# Patient Record
Sex: MALE | Race: WHITE | NOT HISPANIC OR LATINO | Employment: FULL TIME | URBAN - METROPOLITAN AREA
[De-identification: names, ages, dates, MRNs, and addresses within clinical notes are randomized per-mention and may not be internally consistent; named-entity substitution may affect disease eponyms.]

---

## 2017-01-04 ENCOUNTER — TREATMENT (OUTPATIENT)
Dept: PHYSICAL THERAPY | Facility: CLINIC | Age: 52
End: 2017-01-04

## 2017-01-04 DIAGNOSIS — M76.62 ACHILLES TENDINITIS OF LEFT LOWER EXTREMITY: ICD-10-CM

## 2017-01-04 DIAGNOSIS — G89.29 CHRONIC PAIN OF LEFT ANKLE: Primary | ICD-10-CM

## 2017-01-04 DIAGNOSIS — M25.572 CHRONIC PAIN OF LEFT ANKLE: Primary | ICD-10-CM

## 2017-01-04 PROCEDURE — 97110 THERAPEUTIC EXERCISES: CPT | Performed by: PHYSICAL THERAPIST

## 2017-01-04 PROCEDURE — 97140 MANUAL THERAPY 1/> REGIONS: CPT | Performed by: PHYSICAL THERAPIST

## 2017-01-04 PROCEDURE — 97162 PT EVAL MOD COMPLEX 30 MIN: CPT | Performed by: PHYSICAL THERAPIST

## 2017-01-04 NOTE — PROGRESS NOTES
Complete Evaluation  NAME: Destiney Montes     PATIENT MRN: [id number]  DATE: 1/4/2017          PLAN OF CARE      THERAPY ASSESSMENT:  Physical Therapy Diagnosis: L achilles tendinosis  Functional Limitations: Complaints of Pain, Decreased ROM, Other (Decreased ability to participate in recreational activities)  Length of Therapy: 1 month  PT Frequency: PT 2x week  PT Interventions: Therapeutic exercise - AROM, Therapeutic exercise - stretching, Therapeutic exercise - strengthening, Manual Therapy, Soft Tissue mobilization, Hot packs/ Moist heat, Cold packs, Ice Massage, Ultrasound, Iontophoresis, Electrical stimulation, Home Exercise Program  Patient Agrees with Plan of Care: Yes      REHAB POTENTIAL: Prognosis: Excellent            SHORT TERM GOALS: Short Term Goals Time for Goal Achievement Options: 2 weeks, Patient demonstrates independence with exercise: Yes, Patient will increase ROM to WFL's: Yes, Other Goal: Pt is able to run 1 mile painfree, Other Goal - Progress: Pt is non tender to palpation         LONG TERM GOALS:  Long Term Goals Time for Goal Achievement Options: 4 weeks, Other Goal: Pt is able to run 3-5 miles painfree, Other Goal - Progress: Pt reports 0/10 pain at rest    Manual PT [id number] 15 minutes, Therapy Exercise 91642 10 minutes and Other Procedure CPT 8 minutes US    Timed Code Treatment: 33   Minutes     Total Treatment Time: 70      Minutes      PT SIGNATURE: Laura Art PT   DATE TREATMENT INITIATED: 1/4/2017    Initial Certification  Certification Period: 4/4/2017  I certify that the therapy services are furnished while this patient is under my care.  The services outlined above are required by this patient, and will be reviewed every 90 days.     PHYSICIAN: Dickson Sandoval MD      DATE:     Please sign and return via fax to [phone]. Thank you, [facility].            Background  Start Time: 1300  Mechanism of Injury: Started playing basketball at that  time, run 3-4x/week  Past Medical History: Same pain running marathon 5-6yrs ago  Occupation: IT, sedentary  Precautions/Work Restrictions: Full time  Recent Diagnosis Tests: OTHER (US)  Diagnostic Test Results: L achills tendinosis  Prior Level of Function: 2x/week, 3-4 miles; swimming  Pain Assessment  Pain Location Site: L achilles tendon  Pain Ratin (mild) (5/10 with running)  Symptoms  Describe Behavior Symptoms: ache  When Symptoms Occur: Worse in a.m.  Pain Increases with:: Running, basketball  Behavior of Symptoms  Pain Interrupts Sleep: No  Sleep Position: Supine  Patient Goals  Goals: Eliminate pain, run 1 marathon/yr, 7:30 min/mile half 9:00 min/mile full  Pt Participated in POC and Goals: Yes      General  Palpation: TTP L superior achilles tendon; lat gastroc  Observation: Normal gait; new shoes show some signs of supination B         AROM RLE (degrees)  R Ankle Dorsiflexion 0-20: 8  R Ankle Plantar Flexion 0-45: 38  R Ankle Inversion: 28  R Ankle Eversion: 32     Strength RLE  R Ankle Dorsiflexion: 5/5  R Ankle Plantar Flexion: 5/5  R Ankle Strength Inversion: 5  R Ankle Strength Eversion: 5        AROM LLE (degrees)  L Ankle Dorsiflexion 0-20: 0  L Ankle Plantar Flexion 0-45: 38  R Ankle Inversion: 24  R Ankle Eversion: 30     Strength LLE  L Ankle Dorsiflexion: 5/5  L Ankle Plantar Flexion: 5/5  L Ankle Strength Inversion: 5/5  L Ankle Strength Eversion: 5/5         Lower Extremity Flexibility  Hamstrings: Bilateral:, Moderately limited  Quadriceps: Bilateral:, Mildly limited         Assessment:  Physical Therapy Diagnosis: L achilles tendinosis  Functional Limitations: Complaints of Pain, Decreased ROM, Other (Decreased ability to participate in recreational activities)  Length of Therapy: 1 month  PT Frequency: PT 2x week  PT Interventions: Therapeutic exercise - AROM, Therapeutic exercise - stretching, Therapeutic exercise - strengthening, Manual Therapy, Soft Tissue mobilization, Hot packs/  Moist heat, Cold packs, Ice Massage, Ultrasound, Iontophoresis, Electrical stimulation, Home Exercise Program  Patient Agrees with Plan of Care: Yes  Evaluation Times  Start Time: 1300  Stop Time: 1330  Total Evaluation Time (minutes): 30 min(s)

## 2017-01-04 NOTE — PATIENT INSTRUCTIONS
Pt given HEP and educated re exercises.  Pt educated re diagnosis, relevant anatomy, prognosis, and POC.

## 2017-01-10 ENCOUNTER — TREATMENT (OUTPATIENT)
Dept: PHYSICAL THERAPY | Facility: CLINIC | Age: 52
End: 2017-01-10

## 2017-01-10 DIAGNOSIS — G89.29 CHRONIC PAIN OF LEFT ANKLE: Primary | ICD-10-CM

## 2017-01-10 DIAGNOSIS — M76.62 ACHILLES TENDINITIS OF LEFT LOWER EXTREMITY: ICD-10-CM

## 2017-01-10 DIAGNOSIS — M25.572 CHRONIC PAIN OF LEFT ANKLE: Primary | ICD-10-CM

## 2017-01-10 PROCEDURE — 97140 MANUAL THERAPY 1/> REGIONS: CPT | Performed by: PHYSICAL THERAPIST

## 2017-01-10 PROCEDURE — 97110 THERAPEUTIC EXERCISES: CPT | Performed by: PHYSICAL THERAPIST

## 2017-01-10 NOTE — PROGRESS NOTES
Daily Progress Note    Time In: 1200      Time Out: 1250    Subjective   Pt reports he ran Saturday and today. Saturday felt better. Ankle aches today, not considered painful.      Objective   General  Observation: Old shoes show no significant abnormalities in wear pattern; mild pronation B        PROCEDURES AND MODALITIES:                      Ultrasound 03890  Location: L achilles tendon  Rx Minutes: 6/8  Duty Cycle: 100  Frequency: 1.0 MHz  Intensity - Wts/cm: 1.5              EXERCISE  Exercise 1  Exercise Name 1: Standing lunge calf stretch  Sets/Reps 1: 4  Time: 20s  Exercise 2  Exercise Name 2: Eccentric SL calf raise on step  Sets/Reps 2: 15 Exercise 3  Exercise Name 3: Ankle pumps  Sets/Reps 3: 15 Exercise 4  Exercise Name 4: AROM inversion/eversion  Sets/Reps 4: 15 Exercise 5  Exercise Name 5: Short foot  Sets/Reps 5: 15 Exercise 6  Exercise Name 6: TB eccentric DF  Equipment/Resistance 6: blue  Sets/Reps 6: 15   Exercise 7  Exercise Name 7: Towel inversion/eversion  Sets/Reps 7: 15 each Exercise 8  Exercise Name 8: Bike   Equipment/Resistance 8: AROM  Time 8: 5 mins                                       MANUAL PT:  Manual Rx 1 Location: L gastroc soleus  Manual Rx 1 Type: STM  Manual Rx 2 Location: L achilles tendon  Manual Rx 2 Type: IASTM                     Manual PT [id number] 10 minutes, Therapy Exercise 88666 27 minutes and Other Procedure CPT 8 minutes us    Timed Code Treatment: 45 Minutes and Total Treatment Time: 50 Minutes    Assessment/Plan   Pt tolerated session well w/o c/o pain. Continues to have increased tone in lateral gastroc and mid achilles. Added eccentrics to HEP.  Progress per Plan of Care             Laura Art, PT  Physical Therapist

## 2017-01-13 ENCOUNTER — TREATMENT (OUTPATIENT)
Dept: PHYSICAL THERAPY | Facility: CLINIC | Age: 52
End: 2017-01-13

## 2017-01-13 DIAGNOSIS — M76.62 ACHILLES TENDINITIS OF LEFT LOWER EXTREMITY: Primary | ICD-10-CM

## 2017-01-13 PROCEDURE — 97110 THERAPEUTIC EXERCISES: CPT | Performed by: PHYSICAL THERAPIST

## 2017-01-13 PROCEDURE — 97140 MANUAL THERAPY 1/> REGIONS: CPT | Performed by: PHYSICAL THERAPIST

## 2017-01-13 NOTE — PROGRESS NOTES
Daily Progress Note    Time In: 1:01      Time Out: 1:53    Subjective   About the same - still aches with running        Objective   General  Palpation: TTP M-T junction    AROM:             , , , ,         PROCEDURES AND MODALITIES:                      Ultrasound 39073  Location: L achilles tendon  Rx Minutes: 6/8  Duty Cycle: 100  Frequency: 1.0 MHz  Intensity - Wts/cm: 1.6              EXERCISE  Exercise 1  Exercise Name 1: Standing lunge calf stretch; soleaus stretch  Sets/Reps 1: 4  Time: 20s  Exercise 1 Completed: Yes  Exercise 2  Exercise Name 2: Eccentric SL calf raise on step  Sets/Reps 2: 20  Exercise 2 Completed: Yes Exercise 3  Exercise Name 3: Ankle alphabet  Sets/Reps 3: 1  Exercise 3 Completed: Yes Exercise 4  Exercise Name 4: AROM inversion/eversion - reverse CKC  Sets/Reps 4: 20  Exercise 4 Completed: Yes Exercise 5  Exercise Name 5: Short foot  Sets/Reps 5: 15  Exercise 5 Completed: Yes Exercise 6  Exercise Name 6: TB eccentric DF  Equipment/Resistance 6: blue  Sets/Reps 6: 15  Exercise 6 Completed: Yes   Exercise 7  Exercise Name 7: Towel inversion/eversion  Sets/Reps 7: 15 each  Exercise 7 Completed: Yes Exercise 8  Exercise Name 8: Bike   Equipment/Resistance 8: AROM  Time 8: 5 mins  Exercise 8 Completed: Defer Exercise 9  Exercise Name 9: crosspull with DF/PF  Equipment/Resistance 9: blue  Sets/Reps 9: 20 ea  Exercise 9 Completed: Yes Exercise 10  Exercise Name 10: 3-way balance on 1/2 roll  Time 10: 1 min ea  Exercise 10 Completed: Yes                                   MANUAL PT:  Manual Rx 1 Location: L achilles  Manual Rx 1 Type: STM/DFM  Manual Rx 2 Location: L calf   Manual Rx 2 Type: C/R stretches                     Manual PT [id number] 10 minutes, Therapy Exercise 03733 30 minutes and Other Procedure CPT 81391 minutes 8    Timed Code Treatment: 48 Minutes and Total Treatment Time: 52 Minutes    Assessment/Plan   Improved mobility with only mild c/o pain but still with tenderness and  limited tolerance for prolonged activities    Progress per Plan of Care             More Alex, PT  Physical Therapist

## 2017-01-17 ENCOUNTER — TREATMENT (OUTPATIENT)
Dept: PHYSICAL THERAPY | Facility: CLINIC | Age: 52
End: 2017-01-17

## 2017-01-17 DIAGNOSIS — M25.572 CHRONIC PAIN OF LEFT ANKLE: ICD-10-CM

## 2017-01-17 DIAGNOSIS — M76.62 ACHILLES TENDINITIS OF LEFT LOWER EXTREMITY: Primary | ICD-10-CM

## 2017-01-17 DIAGNOSIS — G89.29 CHRONIC PAIN OF LEFT ANKLE: ICD-10-CM

## 2017-01-17 PROCEDURE — 97140 MANUAL THERAPY 1/> REGIONS: CPT | Performed by: PHYSICAL THERAPIST

## 2017-01-17 PROCEDURE — 97110 THERAPEUTIC EXERCISES: CPT | Performed by: PHYSICAL THERAPIST

## 2017-01-17 NOTE — PROGRESS NOTES
Daily Progress Note    Time In: 1200      Time Out: 1238    Subjective   Pt reports he is feeling improvement. Ran 3 miles under 8 min pace this morning. Still feels ache but does not feel the need to stop.      Objective        EXERCISE  Exercise 1  Exercise Name 1: Standing lunge calf stretch; soleus stretch  Sets/Reps 1: 4  Time: 20s  Exercise 1 Completed: Yes  Exercise 2  Exercise Name 2: Eccentric SL calf raise on step  Sets/Reps 2: 20  Exercise 2 Completed: Yes Exercise 3  Exercise Name 3: Ankle alphabet  Sets/Reps 3: 1  Exercise 3 Completed: Yes Exercise 4  Exercise Name 4: AROM inversion/eversion - reverse CKC  Sets/Reps 4: 20  Exercise 4 Completed: Yes Exercise 5  Exercise Name 5: Short foot  Sets/Reps 5: 15  Exercise 5 Completed: Yes Exercise 6  Exercise Name 6: TB eccentric DF  Equipment/Resistance 6: blue  Sets/Reps 6: 15  Exercise 6 Completed: Yes   Exercise 7  Exercise Name 7: Towel inversion/eversion  Sets/Reps 7: 15 each  Exercise 7 Completed: Defer Exercise 8  Exercise Name 8: Bike   Equipment/Resistance 8: AROM  Time 8: 5 mins  Exercise 8 Completed: Defer Exercise 9  Exercise Name 9: crosspull with DF/PF  Equipment/Resistance 9: blue  Sets/Reps 9: 20 ea  Exercise 9 Completed: Yes Exercise 10  Exercise Name 10: 3-way balance on 1/2 roll  Time 10: 1 min ea  Exercise 10 Completed: Yes Exercise 11  Exercise Name 11: Squat  Sets/Reps 11: 15  Exercise 11 Completed: Yes                                 MANUAL PT:  Manual Rx 1 Location: L achilles, gastrocsoleus  Manual Rx 1 Type: STM, scraping                        Manual PT [id number] 8 minutes and Therapy Exercise 99709 30 minutes    Timed Code Treatment: 38 Minutes and Total Treatment Time: 38 Minutes    Assessment/Plan   Pt had increased difficulty with balance exercise. May benefit from further exercises for ankle stability.   Progress strengthening /stabilization /functional activity squat with resisted hip abd             Laura Art, PT  Physical  Therapist

## 2017-01-20 ENCOUNTER — TREATMENT (OUTPATIENT)
Dept: PHYSICAL THERAPY | Facility: CLINIC | Age: 52
End: 2017-01-20

## 2017-01-20 DIAGNOSIS — M76.62 ACHILLES TENDINITIS OF LEFT LOWER EXTREMITY: Primary | ICD-10-CM

## 2017-01-20 PROCEDURE — 97110 THERAPEUTIC EXERCISES: CPT | Performed by: PHYSICAL THERAPIST

## 2017-01-20 PROCEDURE — 97140 MANUAL THERAPY 1/> REGIONS: CPT | Performed by: PHYSICAL THERAPIST

## 2017-01-20 NOTE — PROGRESS NOTES
Daily Progress Note    Time In: 1:04      Time Out: 1:49    Subjective   It's getting better.  Had some discomfort playing basketball but did stretches and it helped.        Objective   General  Palpation: TTP distal achilles    AROM:                        , , , ,      , , , ,         PROCEDURES AND MODALITIES:                      Ultrasound 52770  Location: L achilles tendon  Rx Minutes: 6/8  Duty Cycle: 100  Frequency: 1.0 MHz  Intensity - Wts/cm: 1.6              EXERCISE  Exercise 1  Exercise Name 1: Standing lunge calf stretch; soleus stretch  Sets/Reps 1: 4  Time: 20s  Exercise 1 Completed: Yes  Exercise 2  Exercise Name 2: Eccentric SL calf raise on step  Sets/Reps 2: 20  Exercise 2 Completed: Yes Exercise 3  Exercise Name 3: Ankle alphabet  Sets/Reps 3: 1  Exercise 3 Completed: Yes Exercise 4  Exercise Name 4: AROM inversion/eversion - reverse CKC  Equipment/Resistance 4: red  Sets/Reps 4: 20  Exercise 4 Completed: Yes Exercise 5  Exercise Name 5: Short foot  Sets/Reps 5: 15  Exercise 5 Completed: Yes Exercise 6  Exercise Name 6: TB eccentric DF  Equipment/Resistance 6: blue  Sets/Reps 6: 15  Exercise 6 Completed: Defer   Exercise 7  Exercise Name 7: Towel inversion/eversion  Sets/Reps 7: 15 each  Exercise 7 Completed: Defer Exercise 8  Exercise Name 8: Bike   Equipment/Resistance 8: AROM  Time 8: 5 mins  Exercise 8 Completed: Defer Exercise 9  Exercise Name 9: crosspull with DF/PF  Equipment/Resistance 9: blue  Sets/Reps 9: 20 ea  Exercise 9 Completed: Yes Exercise 10  Exercise Name 10: 3-way balance on 1/2 roll  Time 10: 1 min ea  Exercise 10 Completed: Yes Exercise 11  Exercise Name 11: Squat  Sets/Reps 11: 15  Exercise 11 Completed: Yes                                 MANUAL PT:  Manual Rx 1 Location: L achilles  Manual Rx 1 Type: STM/DFM  Manual Rx 2 Location: L calf   Manual Rx 2 Type: C/R stretches                     Manual PT [id number] 10 minutes, Therapy Exercise 45425 24 minutes and Other Procedure  CPT 08855 minutes 8    Timed Code Treatment: 42 Minutes and Total Treatment Time: 45 Minutes    Assessment/Plan   Continues with gradual improvement with decreasing c/o pain with ADLs    Progress per Plan of Care             More Alex, PT  Physical Therapist

## 2017-01-24 ENCOUNTER — TREATMENT (OUTPATIENT)
Dept: PHYSICAL THERAPY | Facility: CLINIC | Age: 52
End: 2017-01-24

## 2017-01-24 DIAGNOSIS — G89.29 CHRONIC PAIN OF LEFT ANKLE: ICD-10-CM

## 2017-01-24 DIAGNOSIS — M25.572 CHRONIC PAIN OF LEFT ANKLE: ICD-10-CM

## 2017-01-24 DIAGNOSIS — M76.62 ACHILLES TENDINITIS OF LEFT LOWER EXTREMITY: Primary | ICD-10-CM

## 2017-01-24 PROCEDURE — 97140 MANUAL THERAPY 1/> REGIONS: CPT | Performed by: PHYSICAL THERAPIST

## 2017-01-24 PROCEDURE — 97110 THERAPEUTIC EXERCISES: CPT | Performed by: PHYSICAL THERAPIST

## 2017-01-26 ENCOUNTER — TREATMENT (OUTPATIENT)
Dept: PHYSICAL THERAPY | Facility: CLINIC | Age: 52
End: 2017-01-26

## 2017-01-26 DIAGNOSIS — M25.572 CHRONIC PAIN OF LEFT ANKLE: ICD-10-CM

## 2017-01-26 DIAGNOSIS — M76.62 ACHILLES TENDINITIS OF LEFT LOWER EXTREMITY: Primary | ICD-10-CM

## 2017-01-26 DIAGNOSIS — G89.29 CHRONIC PAIN OF LEFT ANKLE: ICD-10-CM

## 2017-01-26 PROCEDURE — 97140 MANUAL THERAPY 1/> REGIONS: CPT | Performed by: PHYSICAL THERAPIST

## 2017-01-26 PROCEDURE — 97110 THERAPEUTIC EXERCISES: CPT | Performed by: PHYSICAL THERAPIST

## 2017-05-23 ENCOUNTER — DOCUMENTATION (OUTPATIENT)
Dept: PHYSICAL THERAPY | Facility: CLINIC | Age: 52
End: 2017-05-23

## 2017-11-06 ENCOUNTER — FLU SHOT (OUTPATIENT)
Dept: FAMILY MEDICINE CLINIC | Facility: CLINIC | Age: 52
End: 2017-11-06

## 2017-11-06 DIAGNOSIS — Z00.00 HEALTH CARE MAINTENANCE: Primary | ICD-10-CM

## 2017-11-06 DIAGNOSIS — Z23 NEEDS FLU SHOT: Primary | ICD-10-CM

## 2017-11-06 LAB
ALBUMIN SERPL-MCNC: 4.6 G/DL (ref 3.5–5.2)
ALBUMIN/GLOB SERPL: 1.8 G/DL
ALP SERPL-CCNC: 39 U/L (ref 39–117)
ALT SERPL-CCNC: 46 U/L (ref 1–41)
APPEARANCE UR: CLEAR
AST SERPL-CCNC: 59 U/L (ref 1–40)
BASOPHILS # BLD AUTO: 0.02 10*3/MM3 (ref 0–0.2)
BASOPHILS NFR BLD AUTO: 0.4 % (ref 0–1.5)
BILIRUB SERPL-MCNC: 0.5 MG/DL (ref 0.1–1.2)
BILIRUB UR QL STRIP: NEGATIVE
BUN SERPL-MCNC: 22 MG/DL (ref 6–20)
BUN/CREAT SERPL: 24.7 (ref 7–25)
CALCIUM SERPL-MCNC: 9.3 MG/DL (ref 8.6–10.5)
CHLORIDE SERPL-SCNC: 104 MMOL/L (ref 98–107)
CHOLEST SERPL-MCNC: 203 MG/DL (ref 0–200)
CO2 SERPL-SCNC: 24.2 MMOL/L (ref 22–29)
COLOR UR: YELLOW
CREAT SERPL-MCNC: 0.89 MG/DL (ref 0.76–1.27)
EOSINOPHIL # BLD AUTO: 0.22 10*3/MM3 (ref 0–0.7)
EOSINOPHIL NFR BLD AUTO: 4.3 % (ref 0.3–6.2)
ERYTHROCYTE [DISTWIDTH] IN BLOOD BY AUTOMATED COUNT: 13.5 % (ref 11.5–14.5)
GFR SERPLBLD CREATININE-BSD FMLA CKD-EPI: 109 ML/MIN/1.73
GFR SERPLBLD CREATININE-BSD FMLA CKD-EPI: 90 ML/MIN/1.73
GLOBULIN SER CALC-MCNC: 2.6 GM/DL
GLUCOSE SERPL-MCNC: 92 MG/DL (ref 65–99)
GLUCOSE UR QL: NEGATIVE
HCT VFR BLD AUTO: 40 % (ref 40.4–52.2)
HDLC SERPL-MCNC: 74 MG/DL (ref 40–60)
HGB BLD-MCNC: 13.3 G/DL (ref 13.7–17.6)
HGB UR QL STRIP: NEGATIVE
IMM GRANULOCYTES # BLD: 0 10*3/MM3 (ref 0–0.03)
IMM GRANULOCYTES NFR BLD: 0 % (ref 0–0.5)
KETONES UR QL STRIP: NEGATIVE
LDLC SERPL CALC-MCNC: 119 MG/DL (ref 0–100)
LDLC/HDLC SERPL: 1.61 {RATIO}
LEUKOCYTE ESTERASE UR QL STRIP: NEGATIVE
LYMPHOCYTES # BLD AUTO: 1.73 10*3/MM3 (ref 0.9–4.8)
LYMPHOCYTES NFR BLD AUTO: 33.5 % (ref 19.6–45.3)
MCH RBC QN AUTO: 33.6 PG (ref 27–32.7)
MCHC RBC AUTO-ENTMCNC: 33.3 G/DL (ref 32.6–36.4)
MCV RBC AUTO: 101 FL (ref 79.8–96.2)
MONOCYTES # BLD AUTO: 0.31 10*3/MM3 (ref 0.2–1.2)
MONOCYTES NFR BLD AUTO: 6 % (ref 5–12)
NEUTROPHILS # BLD AUTO: 2.89 10*3/MM3 (ref 1.9–8.1)
NEUTROPHILS NFR BLD AUTO: 55.8 % (ref 42.7–76)
NITRITE UR QL STRIP: NEGATIVE
PH UR STRIP: 6 [PH] (ref 5–8)
PLATELET # BLD AUTO: 174 10*3/MM3 (ref 140–500)
POTASSIUM SERPL-SCNC: 4.3 MMOL/L (ref 3.5–5.2)
PROT SERPL-MCNC: 7.2 G/DL (ref 6–8.5)
PROT UR QL STRIP: NEGATIVE
RBC # BLD AUTO: 3.96 10*6/MM3 (ref 4.6–6)
SODIUM SERPL-SCNC: 143 MMOL/L (ref 136–145)
SP GR UR: 1.03 (ref 1–1.03)
TRIGL SERPL-MCNC: 49 MG/DL (ref 0–150)
UROBILINOGEN UR STRIP-MCNC: NORMAL MG/DL
VLDLC SERPL CALC-MCNC: 9.8 MG/DL (ref 5–40)
WBC # BLD AUTO: 5.17 10*3/MM3 (ref 4.5–10.7)

## 2017-11-06 PROCEDURE — 90471 IMMUNIZATION ADMIN: CPT | Performed by: FAMILY MEDICINE

## 2017-11-06 PROCEDURE — 90686 IIV4 VACC NO PRSV 0.5 ML IM: CPT | Performed by: FAMILY MEDICINE

## 2017-11-07 NOTE — PROGRESS NOTES
The HDL or good cholesterol is high which is favorable.  Liver enzymes are minimally elevated.  May be related to alcohol use.  We'll discuss more at upcoming visit.

## 2017-11-13 ENCOUNTER — OFFICE VISIT (OUTPATIENT)
Dept: FAMILY MEDICINE CLINIC | Facility: CLINIC | Age: 52
End: 2017-11-13

## 2017-11-13 VITALS
DIASTOLIC BLOOD PRESSURE: 64 MMHG | TEMPERATURE: 98.4 F | BODY MASS INDEX: 20.29 KG/M2 | HEART RATE: 56 BPM | WEIGHT: 163.2 LBS | SYSTOLIC BLOOD PRESSURE: 124 MMHG | OXYGEN SATURATION: 99 % | HEIGHT: 75 IN

## 2017-11-13 DIAGNOSIS — Z00.00 HEALTH CARE MAINTENANCE: Primary | ICD-10-CM

## 2017-11-13 DIAGNOSIS — R74.8 ELEVATED LIVER ENZYMES: ICD-10-CM

## 2017-11-13 DIAGNOSIS — N48.9 PENIS DISORDER: ICD-10-CM

## 2017-11-13 DIAGNOSIS — Z12.5 SCREENING PSA (PROSTATE SPECIFIC ANTIGEN): ICD-10-CM

## 2017-11-13 PROCEDURE — 99396 PREV VISIT EST AGE 40-64: CPT | Performed by: FAMILY MEDICINE

## 2017-11-13 NOTE — PROGRESS NOTES
Subjective   Destiney Montes is a 52 y.o. male.     Chief Complaint   Patient presents with   • Annual Exam     follow up labs        History of Present Illness    Here for annual complete physical examination.    Patient continues to have penile pain due to foreskin adhesions.  This causes erectile dysfunction.  He could not afford the Viagra.  Continues be an issue.    Ongoing Achilles tendon discomfort.  He's not doing the stretches as much is used.  The used to help.  He continues to run.    Elevated liver enzymes.  He had maybe a beer or 2 prior to the blood draw.  He states he has minimal alcohol otherwise.  No history of hepatitis.  He has no right upper quadrant abdominal pain or other GI symptoms.    Social History   Substance Use Topics   • Smoking status: Never Smoker   • Smokeless tobacco: Never Used   • Alcohol use 2.4 oz/week     4 Cans of beer per week     Immunization History   Administered Date(s) Administered   • Flu Vaccine Quad PF >36MO 11/06/2017   • Hepatitis B 01/01/2001   • Tdap 02/10/2012     Family History   Problem Relation Age of Onset   • Hyperlipidemia Mother    • Diabetes Mother    • Parkinsonism Father          The following portions of the patient's history were reviewed and updated as appropriate: allergies, current medications, past family history, past medical history, past social history, past surgical history and problem list.          Review of Systems   Constitutional: Negative.    HENT: Negative.    Respiratory: Negative.    Cardiovascular: Negative.    Gastrointestinal: Negative.  Negative for blood in stool.   Endocrine: Negative.    Genitourinary: Positive for penile pain. Negative for hematuria.   Musculoskeletal: Negative.    Skin: Negative.    Neurological: Negative.  Negative for headaches.   Psychiatric/Behavioral: Negative.    All other systems reviewed and are negative.      Objective   Blood pressure 124/64, pulse 56, temperature 98.4 °F (36.9 °C), temperature  "source Oral, height 75\" (190.5 cm), weight 163 lb 3.2 oz (74 kg), SpO2 99 %.  Physical Exam   Constitutional: He is oriented to person, place, and time. He appears well-developed and well-nourished. No distress.   HENT:   Head: Normocephalic and atraumatic.   Right Ear: External ear normal.   Left Ear: External ear normal.   Nose: Nose normal.   Mouth/Throat: Oropharynx is clear and moist. No oropharyngeal exudate.   Eyes: Conjunctivae and EOM are normal. Pupils are equal, round, and reactive to light.   Neck: Normal range of motion. Neck supple. No tracheal deviation present. No thyromegaly present.   Cardiovascular: Normal rate, regular rhythm, normal heart sounds and intact distal pulses.    No murmur heard.  Pulmonary/Chest: Effort normal and breath sounds normal.   Abdominal: Soft. Bowel sounds are normal. He exhibits no abdominal bruit and no mass. There is no hepatosplenomegaly. There is no tenderness. No hernia. Hernia confirmed negative in the right inguinal area and confirmed negative in the left inguinal area.   Genitourinary: Prostate normal and testes normal. Prostate is not enlarged ( No nodules.  No masses.) and not tender (smooth, symmetric). Right testis shows no mass and no tenderness. Left testis shows no mass and no tenderness.   Genitourinary Comments: Not circumcised.   Musculoskeletal: Normal range of motion. He exhibits no edema.   Lymphadenopathy:     He has no cervical adenopathy.   Neurological: He is alert and oriented to person, place, and time. He exhibits normal muscle tone.   Skin: Skin is warm. No rash noted.   Psychiatric: He has a normal mood and affect. His behavior is normal. Judgment and thought content normal.   Nursing note and vitals reviewed.      Assessment/Plan   Destiney was seen today for annual exam.    Diagnoses and all orders for this visit:    Health care maintenance    Penis disorder  -     Ambulatory Referral to Urology    Elevated liver enzymes  -     Hepatic " Function Panel  -     Hepatitis B & C Profile    Screening PSA (prostate specific antigen)  -     PSA    Annual complete physical examination.    Immunizations up-to-date.    Painful foreskin adhesions.  Recommend referral to urology.  He may be a candidate for elective circumcision.    Elevated liver enzymes.  Cause unknown.  Possible alcohol use.  He does not take a lot of Tylenol.  No known history of hepatitis.  No abdominal pain.  I'm recommending repeating the hepatitis function panel and check hepatitis B and C.  If they continue to be elevated, to consider ultrasound other evaluation the future.    Benefits and limitations of PSA testing discussed.    Colonoscopy up-to-date

## 2017-11-14 DIAGNOSIS — R74.8 ELEVATED LIVER ENZYMES: Primary | ICD-10-CM

## 2017-11-14 LAB
ALBUMIN SERPL-MCNC: 4.8 G/DL (ref 3.5–5.2)
ALP SERPL-CCNC: 41 U/L (ref 39–117)
ALT SERPL-CCNC: 42 U/L (ref 1–41)
AST SERPL-CCNC: 74 U/L (ref 1–40)
BILIRUB DIRECT SERPL-MCNC: <0.2 MG/DL (ref 0–0.3)
BILIRUB SERPL-MCNC: 0.3 MG/DL (ref 0.1–1.2)
HBV CORE AB SERPL QL IA: NEGATIVE
HBV CORE IGM SERPL QL IA: NEGATIVE
HBV E AB SERPL QL IA: NEGATIVE
HBV E AG SERPL QL IA: NEGATIVE
HBV SURFACE AB SER QL: NON REACTIVE
HBV SURFACE AG SERPL QL IA: NEGATIVE
HCV AB S/CO SERPL IA: <0.1 S/CO RATIO (ref 0–0.9)
LABORATORY COMMENT REPORT: NORMAL
PROT SERPL-MCNC: 7.4 G/DL (ref 6–8.5)
PSA SERPL-MCNC: 0.49 NG/ML (ref 0–4)

## 2017-11-14 NOTE — PROGRESS NOTES
I spoke with the patient this evening ......Liver enzymes are still elevated.  Per our conversation, ordering ultrasound of liver and repeat liver enzymes.  See email.

## 2017-11-19 ENCOUNTER — RESULTS ENCOUNTER (OUTPATIENT)
Dept: FAMILY MEDICINE CLINIC | Facility: CLINIC | Age: 52
End: 2017-11-19

## 2017-11-19 DIAGNOSIS — R74.8 ELEVATED LIVER ENZYMES: ICD-10-CM

## 2017-11-24 ENCOUNTER — HOSPITAL ENCOUNTER (OUTPATIENT)
Dept: ULTRASOUND IMAGING | Facility: HOSPITAL | Age: 52
Discharge: HOME | End: 2017-11-24
Admitting: FAMILY MEDICINE

## 2017-11-24 DIAGNOSIS — R74.8 ELEVATED LIVER ENZYMES: ICD-10-CM

## 2017-11-24 PROCEDURE — 76705 ECHO EXAM OF ABDOMEN: CPT

## 2017-11-30 NOTE — PROGRESS NOTES
The liver sonogram showed no abnormalities.  There was mild gallbladder sludge, but this is likely not causing any symptoms, and would not cause the liver enzymes to be elevated.  I do recommend a repeat hepatic function panel, the blood work has already been ordered.

## 2017-12-13 LAB
ALBUMIN SERPL-MCNC: 4.4 G/DL (ref 3.5–5.2)
ALP SERPL-CCNC: 38 U/L (ref 39–117)
ALT SERPL-CCNC: 26 U/L (ref 1–41)
AST SERPL-CCNC: 30 U/L (ref 1–40)
BILIRUB DIRECT SERPL-MCNC: <0.2 MG/DL (ref 0–0.3)
BILIRUB SERPL-MCNC: 0.3 MG/DL (ref 0.1–1.2)
PROT SERPL-MCNC: 7.3 G/DL (ref 6–8.5)

## 2019-01-09 ENCOUNTER — OFFICE VISIT (OUTPATIENT)
Dept: FAMILY MEDICINE CLINIC | Facility: CLINIC | Age: 54
End: 2019-01-09

## 2019-01-09 VITALS
SYSTOLIC BLOOD PRESSURE: 132 MMHG | TEMPERATURE: 97.9 F | WEIGHT: 172.9 LBS | OXYGEN SATURATION: 100 % | DIASTOLIC BLOOD PRESSURE: 79 MMHG | HEART RATE: 58 BPM | BODY MASS INDEX: 21.5 KG/M2 | HEIGHT: 75 IN

## 2019-01-09 DIAGNOSIS — R74.8 ELEVATED LIVER ENZYMES: ICD-10-CM

## 2019-01-09 DIAGNOSIS — E78.00 PURE HYPERCHOLESTEROLEMIA: ICD-10-CM

## 2019-01-09 DIAGNOSIS — R03.0 ELEVATED BLOOD PRESSURE READING: Primary | ICD-10-CM

## 2019-01-09 PROBLEM — E78.9 BORDERLINE HIGH CHOLESTEROL: Status: ACTIVE | Noted: 2018-11-29

## 2019-01-09 PROCEDURE — 99214 OFFICE O/P EST MOD 30 MIN: CPT | Performed by: FAMILY MEDICINE

## 2019-01-09 NOTE — PROGRESS NOTES
"Nate Montes is a 53 y.o. male.     Chief Complaint   Patient presents with   • Hypertension   • Hyperlipidemia        History of Present Illness    Hyperlipidemia.  He was on statins in the past.  We had stopped him.  High HDL.  Recently his total cholesterol was 240, HDL 77, LDL up 153, previously 119 off medication.  His 10 year risk of atherosclerotic coronary vascular disease based on the ACC guidelines is 3%.  Optimal risk 2%.  No family history of coronary disease.  No cardiovascular symptoms.    Elevated blood-pressure readings.  Today 132 systolic.  Recently 136/82 at the above work physical.  No previous known hypertension.    Elevated liver enzymes.  They have been up and down in the past.  Negative hepatitis B and C profile.  Thought related to occasional alcohol use.  We also did a liver ultrasound urine half ago which showed some gallbladder sludge but no stated hepatitis or other issues.  Recent fasting glucose was normal at 83 with an A1c of 5.3.      The following portions of the patient's history were reviewed and updated as appropriate: allergies, current medications, past family history, past medical history, past social history, past surgical history and problem list.          Review of Systems   Constitutional: Negative.    Respiratory: Negative.    Cardiovascular: Negative.    Musculoskeletal: Negative.        Objective   Blood pressure 132/79, pulse 58, temperature 97.9 °F (36.6 °C), temperature source Oral, height 190.5 cm (75\"), weight 78.4 kg (172 lb 14.4 oz), SpO2 100 %.  Physical Exam   Constitutional: He appears well-developed and well-nourished. No distress.   Neck: No thyromegaly present.   Cardiovascular: Normal rate, regular rhythm, normal heart sounds and intact distal pulses.   Pulmonary/Chest: Effort normal and breath sounds normal.   Musculoskeletal: He exhibits no edema.   Skin: Skin is warm and dry.   Psychiatric: He has a normal mood and affect. His " behavior is normal. Judgment and thought content normal.   Nursing note and vitals reviewed.      Assessment/Plan   Destiney was seen today for hypertension and hyperlipidemia.    Diagnoses and all orders for this visit:    Elevated blood pressure reading    Pure hypercholesterolemia    Elevated liver enzymes  -     Hepatic Function Panel  -     Iron and TIBC  -     Ferritin      Elevated blood-pressure reading.  We will continue to monitor.  See me in 6 months for annual wellness visit.  Need CMP, lipid panel, CBC, and PSA prior to that visit.    Hyperlipidemia.  10 year risk 3%.  Low risk.  At this time I do not recommend statin use.  Continue exercise.  His HDL is high.  However if his LDL gets much higher, to consider statin use in the future.    Elevated liver enzymes.  Likely from occasional alcohol use.  No anything I have not ruled out as hemochromatosis.  Patient is from the [place].  Recommending a iron panel today along with repeat hepatic function panel.

## 2019-01-10 LAB
ALBUMIN SERPL-MCNC: 4.7 G/DL (ref 3.5–5.2)
ALP SERPL-CCNC: 44 U/L (ref 39–117)
ALT SERPL-CCNC: 34 U/L (ref 1–41)
AST SERPL-CCNC: 41 U/L (ref 1–40)
BILIRUB DIRECT SERPL-MCNC: <0.2 MG/DL (ref 0–0.3)
BILIRUB SERPL-MCNC: 0.3 MG/DL (ref 0.1–1.2)
FERRITIN SERPL-MCNC: 244.7 NG/ML (ref 30–400)
IRON SATN MFR SERPL: 29 % (ref 20–50)
IRON SERPL-MCNC: 97 MCG/DL (ref 59–158)
PROT SERPL-MCNC: 8.1 G/DL (ref 6–8.5)
TIBC SERPL-MCNC: 337 MCG/DL
UIBC SERPL-MCNC: 240 MCG/DL

## 2019-01-10 NOTE — PROGRESS NOTES
The liver enzymes are essentially normal.  The iron overload screen is negative.  The previous elevated liver enzymes likely from occasional alcohol use

## 2019-06-03 DIAGNOSIS — E78.00 PURE HYPERCHOLESTEROLEMIA: ICD-10-CM

## 2019-06-03 DIAGNOSIS — Z12.5 SCREENING PSA (PROSTATE SPECIFIC ANTIGEN): ICD-10-CM

## 2019-06-03 DIAGNOSIS — Z00.00 HEALTH CARE MAINTENANCE: Primary | ICD-10-CM

## 2019-06-13 ENCOUNTER — LAB (OUTPATIENT)
Dept: FAMILY MEDICINE CLINIC | Facility: CLINIC | Age: 54
End: 2019-06-13

## 2019-06-13 DIAGNOSIS — E78.00 PURE HYPERCHOLESTEROLEMIA: ICD-10-CM

## 2019-06-13 DIAGNOSIS — Z12.5 SCREENING PSA (PROSTATE SPECIFIC ANTIGEN): ICD-10-CM

## 2019-06-13 DIAGNOSIS — Z00.00 HEALTH CARE MAINTENANCE: ICD-10-CM

## 2019-06-14 LAB
ALBUMIN SERPL-MCNC: 4.4 G/DL (ref 3.5–5.2)
ALBUMIN/GLOB SERPL: 1.7 G/DL
ALP SERPL-CCNC: 41 U/L (ref 39–117)
ALT SERPL-CCNC: 30 U/L (ref 1–41)
APPEARANCE UR: CLEAR
AST SERPL-CCNC: 27 U/L (ref 1–40)
BASOPHILS # BLD AUTO: (no result) 10*3/UL
BASOPHILS # BLD MANUAL: 0 10*3/MM3 (ref 0–0.2)
BASOPHILS NFR BLD MANUAL: 0 % (ref 0–1.5)
BILIRUB SERPL-MCNC: 0.4 MG/DL (ref 0.2–1.2)
BILIRUB UR QL STRIP: NEGATIVE
BUN SERPL-MCNC: 19 MG/DL (ref 6–20)
BUN/CREAT SERPL: 21.1 (ref 7–25)
CALCIUM SERPL-MCNC: 9.5 MG/DL (ref 8.6–10.5)
CHLORIDE SERPL-SCNC: 101 MMOL/L (ref 98–107)
CHOLEST SERPL-MCNC: 216 MG/DL (ref 0–200)
CO2 SERPL-SCNC: 28.7 MMOL/L (ref 22–29)
COLOR UR: YELLOW
CREAT SERPL-MCNC: 0.9 MG/DL (ref 0.76–1.27)
DIFFERENTIAL COMMENT: ABNORMAL
EOSINOPHIL # BLD AUTO: (no result) 10*3/UL
EOSINOPHIL # BLD MANUAL: 0.11 10*3/MM3 (ref 0–0.4)
EOSINOPHIL NFR BLD AUTO: (no result) %
EOSINOPHIL NFR BLD MANUAL: 3 % (ref 0.3–6.2)
ERYTHROCYTE [DISTWIDTH] IN BLOOD BY AUTOMATED COUNT: 13.1 % (ref 12.3–15.4)
GLOBULIN SER CALC-MCNC: 2.6 GM/DL
GLUCOSE SERPL-MCNC: 89 MG/DL (ref 65–99)
GLUCOSE UR QL: NEGATIVE
HCT VFR BLD AUTO: 44.6 % (ref 37.5–51)
HDLC SERPL-MCNC: 73 MG/DL (ref 40–60)
HGB BLD-MCNC: 14.2 G/DL (ref 13–17.7)
HGB UR QL STRIP: NEGATIVE
KETONES UR QL STRIP: NEGATIVE
LDLC SERPL CALC-MCNC: 133 MG/DL (ref 0–100)
LEUKOCYTE ESTERASE UR QL STRIP: NEGATIVE
LYMPHOCYTES # BLD AUTO: (no result) 10*3/UL
LYMPHOCYTES # BLD MANUAL: 1.51 10*3/MM3 (ref 0.7–3.1)
LYMPHOCYTES NFR BLD AUTO: (no result) %
LYMPHOCYTES NFR BLD MANUAL: 41 % (ref 19.6–45.3)
MCH RBC QN AUTO: 33.5 PG (ref 26.6–33)
MCHC RBC AUTO-ENTMCNC: 31.8 G/DL (ref 31.5–35.7)
MCV RBC AUTO: 105.2 FL (ref 79–97)
MONOCYTES # BLD MANUAL: 0.55 10*3/MM3 (ref 0.1–0.9)
MONOCYTES NFR BLD AUTO: (no result) %
MONOCYTES NFR BLD MANUAL: 15 % (ref 5–12)
NEUTROPHILS # BLD MANUAL: 1.51 10*3/MM3 (ref 1.7–7)
NEUTROPHILS NFR BLD AUTO: (no result) %
NEUTROPHILS NFR BLD MANUAL: 41 % (ref 42.7–76)
NITRITE UR QL STRIP: NEGATIVE
PH UR STRIP: 6.5 [PH] (ref 5–8)
PLATELET # BLD AUTO: 177 10*3/MM3 (ref 140–450)
PLATELET BLD QL SMEAR: ABNORMAL
POTASSIUM SERPL-SCNC: 4.3 MMOL/L (ref 3.5–5.2)
PROT SERPL-MCNC: 7 G/DL (ref 6–8.5)
PROT UR QL STRIP: ABNORMAL
PSA SERPL-MCNC: 0.29 NG/ML (ref 0–4)
RBC # BLD AUTO: 4.24 10*6/MM3 (ref 4.14–5.8)
RBC MORPH BLD: ABNORMAL
SODIUM SERPL-SCNC: 141 MMOL/L (ref 136–145)
SP GR UR: 1.02 (ref 1–1.03)
TRIGL SERPL-MCNC: 50 MG/DL (ref 0–150)
UROBILINOGEN UR STRIP-MCNC: ABNORMAL MG/DL
VLDLC SERPL CALC-MCNC: 10 MG/DL
WBC # BLD AUTO: 3.68 10*3/MM3 (ref 3.4–10.8)

## 2019-07-10 ENCOUNTER — OFFICE VISIT (OUTPATIENT)
Dept: FAMILY MEDICINE CLINIC | Facility: CLINIC | Age: 54
End: 2019-07-10

## 2019-07-10 VITALS
WEIGHT: 160.8 LBS | BODY MASS INDEX: 19.99 KG/M2 | TEMPERATURE: 98 F | OXYGEN SATURATION: 97 % | DIASTOLIC BLOOD PRESSURE: 68 MMHG | SYSTOLIC BLOOD PRESSURE: 111 MMHG | HEIGHT: 75 IN | HEART RATE: 63 BPM

## 2019-07-10 DIAGNOSIS — Z00.00 HEALTH CARE MAINTENANCE: Primary | ICD-10-CM

## 2019-07-10 PROBLEM — R74.8 ELEVATED LIVER ENZYMES: Status: RESOLVED | Noted: 2017-11-14 | Resolved: 2019-07-10

## 2019-07-10 PROCEDURE — 99396 PREV VISIT EST AGE 40-64: CPT | Performed by: FAMILY MEDICINE

## 2019-07-10 NOTE — PROGRESS NOTES
Nate Montes is a 53 y.o. male.     Chief Complaint   Patient presents with   • Annual Exam     follow up labs    • Foot Pain     having pain on the top of his left  foot x 2 wks         History of Present Illness     Here for annual wellness visit.  Doing well overall.  He is been exercising frequently including running.  He is having some pain in the top part of his left foot for the last couple weeks it is not causing much of a problem he still exercising.  No known injury.  No swelling.  No redness.    No family history of colon cancer.  His colonoscopy is up-to-date.    He drinks about 4 to 5 glasses of wine a week.  Non-smoker.    Reviewed his lipid panel.  The HDL cholesterol is high.  The LDL cholesterol is only mildly elevated.    Previous elevated liver enzymes are now resolved.    Social History     Tobacco Use   • Smoking status: Never Smoker   • Smokeless tobacco: Never Used   Substance Use Topics   • Alcohol use: Yes     Alcohol/week: 2.4 oz     Types: 2 Glasses of wine, 2 Cans of beer per week   • Drug use: No     Immunization History   Administered Date(s) Administered   • Flu Vaccine Quad PF >36MO 11/06/2017   • Hepatitis B 01/01/2001   • Tdap 02/10/2012     Family History   Problem Relation Age of Onset   • Hyperlipidemia Mother    • Diabetes Mother    • Parkinsonism Father            The following portions of the patient's history were reviewed and updated as appropriate: allergies, current medications, past family history, past medical history, past social history, past surgical history and problem list.          Review of Systems   Constitutional: Negative.    HENT: Negative.    Respiratory: Negative.    Cardiovascular: Negative.    Gastrointestinal: Negative.  Negative for blood in stool.   Endocrine: Negative.    Genitourinary: Negative.  Negative for hematuria.   Skin: Negative.    Neurological: Negative.  Negative for headaches.   Psychiatric/Behavioral: Negative.    All  "other systems reviewed and are negative.      Objective   Blood pressure 111/68, pulse 63, temperature 98 °F (36.7 °C), temperature source Oral, height 190.5 cm (75\"), weight 72.9 kg (160 lb 12.8 oz), SpO2 97 %.  Physical Exam   Constitutional: He is oriented to person, place, and time. No distress.   HENT:   Head: Normocephalic and atraumatic.   Right Ear: External ear normal.   Left Ear: External ear normal.   Mouth/Throat: Oropharynx is clear and moist.   Eyes: EOM are normal. Pupils are equal, round, and reactive to light.   Neck: Normal range of motion. Neck supple.   Cardiovascular: Normal rate and regular rhythm.   Pulmonary/Chest: Effort normal and breath sounds normal.   Abdominal: Soft. Bowel sounds are normal. He exhibits no distension and no mass. There is no tenderness. There is no guarding. No hernia.   Genitourinary: Prostate normal. Prostate is not enlarged and not tender.   Musculoskeletal: Normal range of motion. He exhibits no edema or tenderness.   Examination left foot is unremarkable.  There are some prominent varicosities that are nontender noninflamed.  No bony pain to palpation.  No ligamentous pain.   Neurological: He is alert and oriented to person, place, and time. He exhibits normal muscle tone. Coordination normal.   Skin: Skin is warm and dry.   Psychiatric: He has a normal mood and affect. His behavior is normal.   Nursing note and vitals reviewed.      Assessment/Plan   Destiney was seen today for annual exam and foot pain.    Diagnoses and all orders for this visit:    Health care maintenance      Annual wellness visit.    Immunizations.  Recommend hepatitis A and Shingrix at retail pharmacy.    Colon cancer screening up-to-date.  Repeat in 2023.    Prostate cancer screening up-to-date.  Limitations of PSA testing discussed.    Reviewed lipid panel.  Overall favorable.  He has predominantly high HDL cholesterol.    Health safety issues discussed including alcohol use and regular " exercise.    Foot pain.  Likely musculoskeletal.  If causing further issues he is going to let us know and I can refer him to sports medicine.

## 2020-07-20 ENCOUNTER — OFFICE VISIT (OUTPATIENT)
Dept: FAMILY MEDICINE CLINIC | Facility: CLINIC | Age: 55
End: 2020-07-20

## 2020-07-20 VITALS
TEMPERATURE: 97.5 F | SYSTOLIC BLOOD PRESSURE: 115 MMHG | HEIGHT: 75 IN | HEART RATE: 61 BPM | BODY MASS INDEX: 20.69 KG/M2 | DIASTOLIC BLOOD PRESSURE: 67 MMHG | OXYGEN SATURATION: 97 % | WEIGHT: 166.4 LBS

## 2020-07-20 DIAGNOSIS — Z00.00 HEALTH CARE MAINTENANCE: Primary | ICD-10-CM

## 2020-07-20 DIAGNOSIS — Z12.5 SCREENING PSA (PROSTATE SPECIFIC ANTIGEN): ICD-10-CM

## 2020-07-20 DIAGNOSIS — R71.8 ELEVATED MCV: ICD-10-CM

## 2020-07-20 PROCEDURE — 99396 PREV VISIT EST AGE 40-64: CPT | Performed by: FAMILY MEDICINE

## 2020-07-20 RX ORDER — CETIRIZINE HYDROCHLORIDE 10 MG/1
10 TABLET ORAL DAILY
COMMUNITY

## 2020-07-20 NOTE — PROGRESS NOTES
"Nate Montes is a 54 y.o. male.     Chief Complaint   Patient presents with   • Annual Exam     pt is not fasting        History of Present Illness    Patient is here for his annual wellness visit.  He had a small breakfast of oatmeal with not much added sugar fat.  He has been getting some exercise.  Runs weekly.  Not as much as he is been exercising in the past.  He drinks about 3 or 4 glasses of wine a week spread out.  He has no concerns about his health today.  Reviewed his lab work from last year.  Overall lipid panel was excellent with a higher HDL and a mildly elevated LDL.    The following portions of the patient's history were reviewed and updated as appropriate: allergies, current medications, past family history, past medical history, past social history, past surgical history and problem list.          Review of Systems   Constitutional: Negative.    HENT: Negative.    Respiratory: Negative.    Cardiovascular: Negative.    Gastrointestinal: Negative.  Negative for blood in stool.        No dysphagia.  No severe acid reflux symptoms.   Endocrine: Negative.    Genitourinary: Negative.  Negative for hematuria.   Musculoskeletal: Negative.    Skin: Negative.    Neurological: Negative.    Psychiatric/Behavioral: Negative.    All other systems reviewed and are negative.      Objective   Blood pressure 115/67, pulse 61, temperature 97.5 °F (36.4 °C), temperature source Tympanic, height 190.5 cm (75\"), weight 75.5 kg (166 lb 6.4 oz), SpO2 97 %.  Physical Exam   Constitutional: He is oriented to person, place, and time. No distress.   HENT:   Head: Normocephalic and atraumatic.   Right Ear: External ear normal.   Left Ear: External ear normal.   Mouth/Throat: Oropharynx is clear and moist.   Eyes: Pupils are equal, round, and reactive to light. EOM are normal.   Neck: Normal range of motion. Neck supple.   Cardiovascular: Normal rate and regular rhythm.   Pulmonary/Chest: Effort normal and " breath sounds normal.   Abdominal: Soft. Bowel sounds are normal. He exhibits no distension and no mass. There is no tenderness. There is no guarding. No hernia.   Genitourinary: Prostate normal. Prostate is not enlarged and not tender.   Musculoskeletal: Normal range of motion. He exhibits no edema or tenderness.   Neurological: He is alert and oriented to person, place, and time. He exhibits normal muscle tone. Coordination normal.   Skin: Skin is warm and dry.   Psychiatric: He has a normal mood and affect. His behavior is normal.   Nursing note and vitals reviewed.      Assessment/Plan   Destiney was seen today for annual exam.    Diagnoses and all orders for this visit:    Health care maintenance  -     CBC & Differential  -     Comprehensive Metabolic Panel  -     Lipid Panel  -     PSA Screen  -     Vitamin B12    Elevated MCV  -     CBC & Differential  -     Vitamin B12    Screening PSA (prostate specific antigen)  -     PSA Screen      Annual wellness visit.    Immunizations.  Recommend Shingrix at retail pharmacy.    Prostate cancer screening up-to-date.    Colon cancer screening up-to-date.    Elevated mean cell volume on his CBC for the last 5 years.  Slowly going up.  Normal hemoglobin.  Previously normal iron and ferritin levels.  Rechecking a B12 level today.  Also rechecking the CBC.  Possible low vitamin B12 that is subclinical.    Preventive health practices discussed including regular exercise.  I will see him back in 1 year sooner as needed

## 2020-07-21 LAB
ALBUMIN SERPL-MCNC: 4.6 G/DL (ref 3.8–4.9)
ALBUMIN/GLOB SERPL: 1.6 {RATIO} (ref 1.2–2.2)
ALP SERPL-CCNC: 41 IU/L (ref 39–117)
ALT SERPL-CCNC: 20 IU/L (ref 0–44)
AST SERPL-CCNC: 31 IU/L (ref 0–40)
BASOPHILS # BLD AUTO: 0 X10E3/UL (ref 0–0.2)
BASOPHILS NFR BLD AUTO: 1 %
BILIRUB SERPL-MCNC: 0.4 MG/DL (ref 0–1.2)
BUN SERPL-MCNC: 17 MG/DL (ref 6–24)
BUN/CREAT SERPL: 19 (ref 9–20)
CALCIUM SERPL-MCNC: 9.7 MG/DL (ref 8.7–10.2)
CHLORIDE SERPL-SCNC: 101 MMOL/L (ref 96–106)
CHOLEST SERPL-MCNC: 253 MG/DL (ref 100–199)
CO2 SERPL-SCNC: 29 MMOL/L (ref 20–29)
CREAT SERPL-MCNC: 0.88 MG/DL (ref 0.76–1.27)
EOSINOPHIL # BLD AUTO: 0.2 X10E3/UL (ref 0–0.4)
EOSINOPHIL NFR BLD AUTO: 4 %
ERYTHROCYTE [DISTWIDTH] IN BLOOD BY AUTOMATED COUNT: 12.6 % (ref 11.6–15.4)
GLOBULIN SER CALC-MCNC: 2.8 G/DL (ref 1.5–4.5)
GLUCOSE SERPL-MCNC: 86 MG/DL (ref 65–99)
HCT VFR BLD AUTO: 44.5 % (ref 37.5–51)
HDLC SERPL-MCNC: 67 MG/DL
HGB BLD-MCNC: 15.2 G/DL (ref 13–17.7)
IMM GRANULOCYTES # BLD AUTO: 0 X10E3/UL (ref 0–0.1)
IMM GRANULOCYTES NFR BLD AUTO: 0 %
LDLC SERPL CALC-MCNC: 171 MG/DL (ref 0–99)
LYMPHOCYTES # BLD AUTO: 1.6 X10E3/UL (ref 0.7–3.1)
LYMPHOCYTES NFR BLD AUTO: 41 %
MCH RBC QN AUTO: 33.9 PG (ref 26.6–33)
MCHC RBC AUTO-ENTMCNC: 34.2 G/DL (ref 31.5–35.7)
MCV RBC AUTO: 99 FL (ref 79–97)
MONOCYTES # BLD AUTO: 0.2 X10E3/UL (ref 0.1–0.9)
MONOCYTES NFR BLD AUTO: 6 %
NEUTROPHILS # BLD AUTO: 1.9 X10E3/UL (ref 1.4–7)
NEUTROPHILS NFR BLD AUTO: 48 %
PLATELET # BLD AUTO: 193 X10E3/UL (ref 150–450)
POTASSIUM SERPL-SCNC: 3.8 MMOL/L (ref 3.5–5.2)
PROT SERPL-MCNC: 7.4 G/DL (ref 6–8.5)
PSA SERPL-MCNC: 0.8 NG/ML (ref 0–4)
RBC # BLD AUTO: 4.49 X10E6/UL (ref 4.14–5.8)
SODIUM SERPL-SCNC: 142 MMOL/L (ref 134–144)
TRIGL SERPL-MCNC: 75 MG/DL (ref 0–149)
VIT B12 SERPL-MCNC: 455 PG/ML (ref 232–1245)
VLDLC SERPL CALC-MCNC: 15 MG/DL (ref 5–40)
WBC # BLD AUTO: 3.9 X10E3/UL (ref 3.4–10.8)

## 2020-07-21 NOTE — PROGRESS NOTES
"The cholesterol level, especially the LDL \"bad cholesterol\" has gone up.  I recommend increasing exercise.  The blood count shows improvement with no evidence of B12 deficiency.  That is good news.  The PSA test is normal.  No evidence of diabetes."

## 2021-03-30 ENCOUNTER — BULK ORDERING (OUTPATIENT)
Dept: CASE MANAGEMENT | Facility: OTHER | Age: 56
End: 2021-03-30

## 2021-03-30 DIAGNOSIS — Z23 IMMUNIZATION DUE: ICD-10-CM
